# Patient Record
Sex: FEMALE | Race: WHITE | NOT HISPANIC OR LATINO | Employment: UNEMPLOYED | URBAN - METROPOLITAN AREA
[De-identification: names, ages, dates, MRNs, and addresses within clinical notes are randomized per-mention and may not be internally consistent; named-entity substitution may affect disease eponyms.]

---

## 2024-04-26 ENCOUNTER — OFFICE VISIT (OUTPATIENT)
Dept: OBSTETRICS AND GYNECOLOGY | Age: 49
End: 2024-04-26
Payer: COMMERCIAL

## 2024-04-26 ENCOUNTER — PATIENT ROUNDING (BHMG ONLY) (OUTPATIENT)
Dept: OBSTETRICS AND GYNECOLOGY | Age: 49
End: 2024-04-26
Payer: COMMERCIAL

## 2024-04-26 VITALS
SYSTOLIC BLOOD PRESSURE: 138 MMHG | BODY MASS INDEX: 44.41 KG/M2 | DIASTOLIC BLOOD PRESSURE: 80 MMHG | HEIGHT: 68 IN | WEIGHT: 293 LBS

## 2024-04-26 DIAGNOSIS — L68.0 FEMALE HIRSUTISM: ICD-10-CM

## 2024-04-26 DIAGNOSIS — N90.89 SWELLING OF VULVA: Primary | ICD-10-CM

## 2024-04-26 NOTE — PROGRESS NOTES
[facility]   Obstetrics and Gynecology   New Gynecology Visit    2024    Patient: Dana Caldwell          MR#:[id number]    History of Present Illness    Chief Complaint   Patient presents with    Gynecologic Exam     CC: Swollen above pubic bone,  pt had Hyst       49 y.o. female  who presents for swelling over mons. She states she recently lost some weight but has noticed some swelling over the mons that is concerning to her. Denies presence of boils or pain or redness. No itching.         Obstetric History:  OB History          1    Para        Term                AB   1    Living             SAB   1    IAB        Ectopic        Molar        Multiple        Live Births                   Menstrual History:     No LMP recorded. Patient has had a hysterectomy.              ________________________________________  There is no problem list on file for this patient.    Past Medical History:   Diagnosis Date    Hyperlipidemia     Hypertension      Past Surgical History:   Procedure Laterality Date    GALLBLADDER SURGERY  2019    HERNIA REPAIR  2018    HYSTERECTOMY      WISDOM TOOTH EXTRACTION       Social History     Tobacco Use   Smoking Status Some Days    Types: Cigarettes    Passive exposure: Current   Smokeless Tobacco Not on file     Family History   Problem Relation Age of Onset    Diabetes Mother     Diabetes Maternal Grandmother     Hypertension Paternal Aunt     Lung cancer Paternal Aunt     Hypertension Paternal Uncle     Breast cancer Neg Hx     Ovarian cancer Neg Hx     Uterine cancer Neg Hx     Colon cancer Neg Hx      Prior to Admission medications    Medication Sig Start Date End Date Taking? Authorizing Provider   albuterol sulfate  (90 Base) MCG/ACT inhaler Inhale 2 puffs. 10/17/23  Yes ProviderKassidy MD   atorvastatin (LIPITOR) 10 MG tablet Take 1 tablet by mouth Daily. 23  Yes Provider, MD Kassidy   fluticasone (FLONASE) 50  "MCG/ACT nasal spray 2 sprays into the nostril(s) as directed by provider Daily. 24  Yes Asya Triana APRN   loratadine (CLARITIN) 10 MG tablet Take 1 tablet by mouth Daily.   Yes ProviderKassidy MD   spironolactone (ALDACTONE) 25 MG tablet Take 1 tablet by mouth Daily. 23  Yes Kassidy Camarillo MD   naproxen (NAPROSYN) 500 MG tablet  24   ProviderKassidy MD   promethazine-dextromethorphan (PROMETHAZINE-DM) 6.25-15 MG/5ML syrup Take 5 mL by mouth 4 (Four) Times a Day As Needed for Cough.  Patient not taking: Reported on 2024   Asya Triana APRN     ________________________________________    The following portions of the patient's history were reviewed and updated as appropriate: allergies, current medications, past family history, past medical history, past social history, past surgical history, and problem list.           Objective     /80   Ht 172.7 cm (68\")   Wt 133 kg (293 lb)   BMI 44.55 kg/m²    BP Readings from Last 3 Encounters:   24 138/80   24 138/78   23 144/90      Wt Readings from Last 3 Encounters:   24 133 kg (293 lb)   24 135 kg (298 lb)   23 (!) 137 kg (303 lb)        BMI: Estimated body mass index is 44.55 kg/m² as calculated from the following:    Height as of this encounter: 172.7 cm (68\").    Weight as of this encounter: 133 kg (293 lb).    Physical Exam  Vitals and nursing note reviewed.   Constitutional:       Appearance: Normal appearance.   HENT:      Head: Normocephalic and atraumatic.   Pulmonary:      Effort: Pulmonary effort is normal.   Genitourinary:     General: Normal vulva.      Comments: No swelling or lesions noted on mons.   Neurological:      Mental Status: She is alert and oriented to person, place, and time.   Psychiatric:         Mood and Affect: Mood normal.             Patient is a smoker.      Assessment:  49 y.o. female  who presents for swelling over mons.   Diagnoses " and all orders for this visit:    1. Swelling of vulva (Primary)  Comments:  - Discussed with patient that swelling is due to deposition of adipose tissue over the area.   - No need for further evaluation unless new symptoms arise.    2. Female hirsutism  Comments:  - Facial hair present, pt on 25mg Spironolactone.   - Disc that we typically use 50-100mg and to ask PCP if she may increase dosage.          Plan:  No follow-ups on file.      Sandie Haddad MD  4/26/2024 13:58 EDT

## 2024-10-15 ENCOUNTER — PATIENT ROUNDING (BHMG ONLY) (OUTPATIENT)
Dept: URGENT CARE | Facility: CLINIC | Age: 49
End: 2024-10-15
Payer: COMMERCIAL

## 2024-10-15 NOTE — ED NOTES
Thank you for letting us care for you in your recent visit to our urgent care center. We would love to hear about your experience with us. Was this the first time you have visited our location?    We’re always looking for ways to make our patients’ experiences even better. Do you have any recommendations on ways we may improve?     I appreciate you taking the time to respond. Please be on the lookout for a survey about your recent visit from [employer] via text or email. We would greatly appreciate if you could fill that out and turn it back in. We want your voice to be heard and we value your feedback.   Thank you for choosing [facility] for your healthcare needs.